# Patient Record
Sex: FEMALE | Race: WHITE | Employment: FULL TIME | URBAN - METROPOLITAN AREA
[De-identification: names, ages, dates, MRNs, and addresses within clinical notes are randomized per-mention and may not be internally consistent; named-entity substitution may affect disease eponyms.]

---

## 2018-01-30 ENCOUNTER — ED HISTORICAL/CONVERTED ENCOUNTER (OUTPATIENT)
Dept: OTHER | Age: 43
End: 2018-01-30

## 2018-02-03 ENCOUNTER — OFFICE VISIT (OUTPATIENT)
Dept: NEUROLOGY | Age: 43
End: 2018-02-03

## 2018-02-03 VITALS
OXYGEN SATURATION: 98 % | HEART RATE: 77 BPM | SYSTOLIC BLOOD PRESSURE: 114 MMHG | RESPIRATION RATE: 18 BRPM | TEMPERATURE: 98.2 F | HEIGHT: 63 IN | BODY MASS INDEX: 43.43 KG/M2 | WEIGHT: 245.1 LBS | DIASTOLIC BLOOD PRESSURE: 76 MMHG

## 2018-02-03 DIAGNOSIS — H81.12 BPV (BENIGN POSITIONAL VERTIGO), LEFT: Primary | ICD-10-CM

## 2018-02-03 RX ORDER — MECLIZINE HCL 25MG 25 MG/1
TABLET, CHEWABLE ORAL 3 TIMES DAILY
COMMUNITY

## 2018-02-03 NOTE — PROGRESS NOTES
[address] [facility] [address]   [place] [address]   Dolores Thompson    [facility]   [phone] Fax             Referring: ED    Chief Complaint   Patient presents with    Dizziness     continuous x 5 days   sudden onset            ER@ [facility]    Headache     episodic x 5 days      sudden onset    Vision Change     floaters, yellow lines (with vomiting)     18-year-old right-handed woman who presents today for evaluation of what she calls dizziness and headache. She is accompanied by her . She notes that this past Tuesday she awakened after going to bed Monday night completely fine, with dizziness. She says she got up on the side of the bed and fell into the floor. She notes that movement made it worse. She had nausea and vomiting. Notes that she just stayed in bed because she would have room spinning if she moved. When she tried to walk she would fall. She would vomit. Later that evening she went the emergency department at [facility] where she was given intravenous fluid and told she was dehydrated and she was told she had vertigo. She was given meclizine 3 times daily. She does not think it has helped much may be a little but it has made her quite drowsy. She says with movement she gets spinning of the room and she believes it spends left. Head movement will do the same. She has not had any diplopia. She has not had any perioral numbness. No weakness of the extremities. No numbness or tingling. Never had this before. She is not having any focal symptoms. She did have a CT scan that was negative. She notes that she is in bed and she rolls over she will get the symptoms and she is now sleeping on her back. Things are little better and she has gone back to work but notes that when she drives and turns her head to do a head check for changing lanes etc. she will get the symptoms. No chest pain or palpitations.   No fever chills. No rashes. Past Medical History:   Diagnosis Date    Headache     Stroke [facility]) 2005    Vertigo        Past Surgical History:   Procedure Laterality Date    HX ACL RECONSTRUCTION Right     HX BLADDER SUSPENSION      HX CARPAL TUNNEL RELEASE      x2    HX  SECTION      x3    HX HYSTERECTOMY  2014    HX UROLOGICAL         Current Outpatient Prescriptions   Medication Sig Dispense Refill    meclizine (ANTIVERT) 25 mg chewable tablet Take  by mouth three (3) times daily. Allergies   Allergen Reactions    Codeine Swelling       Social History   Substance Use Topics    Smoking status: Never Smoker    Smokeless tobacco: Never Used    Alcohol use 2.4 oz/week     4 Shots of liquor per week       Family History   Problem Relation Age of Onset    Cancer Father     Heart Disease Father     Stroke Father        Review of Systems  Pertinent positives and negatives are as noted above otherwise comprehensive systems review is negative    Examination  Visit Vitals    /76    Pulse 77    Temp 98.2 °F (36.8 °C) (Oral)    Resp 18    Ht 5' 3\" (1.6 m)    Wt 111.2 kg (245 lb 1.6 oz)    SpO2 98%    BMI 43.42 kg/m2     Pleasant, well appearing. No icterus. Oropharynx clear. Supple neck without bruit. Heart regular. No murmur. No edema. Neurologically, she is awake, alert, and oriented with normal speech and language. Her cognition is normal.  She is able to discuss her medical history. She is able to discuss her medications. She is able to discuss current events. Intact cranial nerves 2-12. No nystagmus. Visual fields full to confrontation. Disk margins are flat bilaterally. She has normal bulk and tone. She has no abnormal movement. She has no pronation or drift. She generates full strength in the upper and lower extremities to direct confrontational testing. Reflexes are symmetrical in the upper and lower extremities bilaterally.   Her toes are down bilaterally. No Avilez. Finger nose finger and rapid alternating movements are normal.  Steady gait. She is able to heel, toe, and tandem walk. No sensory deficit to primary modalities. No Romberg. Modified Baranay reproduced symptoms with left ear down. Impression/Plan  24-year-old pleasant lady with benign positional vertigo left-sided as symptoms are reproduced with left ear down. Reassuring is the fact that her symptoms are positional/movement induced and she has no symptom of vertigo and primary resting type position. We discussed benign positional vertigo. We will send her to vestibular therapy and she lives down in [place] [address] so we will send her to the [facility] where they have vestibular therapy available. Discussed what this entails. Discussed the concept of benign positional vertigo and I drew her some pictures to explain that. Discussed both repositioning maneuvers as well as conditioning exercises. As long as she does well from these all see her as needed. This note was created using voice recognition software. Despite editing, there may be syntax errors. This note will not be viewable in [address]

## 2018-02-03 NOTE — MR AVS SNAPSHOT
[address] [facility] [address] [facility] [id number] [phone] Patient: Cassius Tolentino MRN: [id number] Visit Information Date & Time Provider Department Dept. Phone Encounter #  
 2/3/2018 10:00 AM Rio Lassiter MD [facility] Neurology [facility] [phone] [id number] Follow-up Instructions Return if symptoms worsen or fail to improve. Upcoming Health Maintenance Date Due DTaP/Tdap/Td series (1 - Tdap) 1/12/1994 PAP AKA CERVICAL CYTOLOGY 1/12/1994 Influenza Age 5 to Adult 8/1/2017 Allergies as of 2/3/2018  Review Complete On: 2/3/2018 By: Rio Lassiter MD  
  
 Severity Noted Reaction Type Reactions Codeine  02/03/2018    Swelling Current Immunizations  Never Reviewed No immunizations on file. Not reviewed this visit You Were Diagnosed With   
  
 Codes Comments BPV (benign positional vertigo), left    -  Primary ICD-10-CM: H81.12 
ICD-9-CM: 386.11 Vitals BP Pulse Temp Resp Height(growth percentile) Weight(growth percentile) 114/76 77 98.2 °F (36.8 °C) (Oral) 18 5' 3\" (1.6 m) 245 lb 1.6 oz (111.2 kg) SpO2 BMI OB Status Smoking Status 98% 43.42 kg/m2 Hysterectomy Never Smoker Vitals History BMI and BSA Data Body Mass Index Body Surface Area  
 43.42 kg/m 2 2.22 m 2 Your Updated Medication List  
  
   
This list is accurate as of: 2/3/18 10:34 AM.  Always use your most recent med list.  
  
  
  
  
 meclizine 25 mg chewable tablet Commonly known as:  ANTIVERT Take  by mouth three (3) times daily. We Performed the Following REFERRAL TO PHYSICAL THERAPY [GYR81 Custom] Comments:  
 [facility] Follow-up Instructions Return if symptoms worsen or fail to improve. Referral Information Referral ID Referred By Referred To [id number] GEORGIE LANDRUM Not Available Visits Status Start Date End Date 1 New Request 2/3/18 2/3/19 If your referral has a status of pending review or denied, additional information will be sent to support the outcome of this decision. Patient Instructions Information Regarding Testing If you have physican order for a test or a medication denied by your insurance company, this does not mean the test or medication is not appropriate for you as that is a medical decision, not a decision to be made by an insurance company representative or by an [facility] physician who has not interviewed and examined you. This is a decision to be made between you and your physician. The denial of services is a contractual matter between you and your insurance company, not an issue between your physician and the insurance company. If your test or medication is denied, you can take the following steps to help resolve the issue: 1. File a complaint with the [facility] regarding your insurance company's denial of services ordered for you. You can do this either by calling them directly or by completing an on-line complaint form on the [email or web address]. This can be found at www.Straight Up English 2. Also file a formal complaint with your insurance company and ask to have the name of the person denying the service so that you may explore a legal option should you be harmed by this denial of service. Again, the fact the insurance company will not pay for the service does not mean it is not medically necessary and I would encourage you to follow through with the plan that was made with your physician 3. File a written complaint with your employer so your employer and benefit manager is aware of the poor coverage they are providing their employees. If you have medicare/medicaid, complain to your representative in the House and to your Beth Krueger. If we have ordered testing for you, we do not call patients with results and we do not give test results over the phone. We schedule follow up appointments so that your results can be discussed in person and any questions you have regarding them may be addressed. If something of concern is revealed on your test, we will call you for a sooner follow up appointment. Additionally, results may be found by using the My Chart feature and one of our patient service representatives at the  can give you instructions on how to access this feature of our electronic medical record system. Silvina[address] Gupta [address] What is a living will? A living will is a legal form you use to write down the kind of care you want at the end of your life. It is used by the health professionals who will treat you if you aren't able to decide for yourself. If you put your wishes in writing, your loved ones and others will know what kind of care you want. They won't need to guess. This can ease your mind and be helpful to others. A living will is not the same as an estate or property will. An estate will explains what you want to happen with your money and property after you die. Is a living will a legal document? A living will is a legal document. Each state has its own laws about living mcguire. If you move to another state, make sure that your living will is legal in the state where you now live. Or you might use a universal form that has been approved by many states. This kind of form can sometimes be completed and stored online. Your electronic copy will then be available wherever you have a connection to the Internet. In most cases, doctors will respect your wishes even if you have a form from a different state. · You don't need an  to complete a living will.  But legal advice can be helpful if your state's laws are unclear, your health history is complicated, or your family can't agree on what should be in your living will. · You can change your living will at any time. Some people find that their wishes about end-of-life care change as their health changes. · In addition to making a living will, think about completing a medical power of  form. This form lets you name the person you want to make end-of-life treatment decisions for you (your \"health care agent\") if you're not able to. Many hospitals and nursing homes will give you the forms you need to complete a living will and a medical power of . · Your living will is used only if you can't make or communicate decisions for yourself anymore. If you become able to make decisions again, you can accept or refuse any treatment, no matter what you wrote in your living will. · Your state may offer an online registry. This is a place where you can store your living will online so the doctors and nurses who need to treat you can find it right away. What should you think about when creating a living will? Talk about your end-of-life wishes with your family members and your doctor. Let them know what you want. That way the people making decisions for you won't be surprised by your choices. Think about these questions as you make your living will: · Do you know enough about life support methods that might be used? If not, talk to your doctor so you know what might be done if you can't breathe on your own, your heart stops, or you're unable to swallow. · What things would you still want to be able to do after you receive life-support methods? Would you want to be able to walk? To speak? To eat on your own? To live without the help of machines? · If you have a choice, where do you want to be cared for? In your home? At a hospital or nursing home? · Do you want certain Gnosticism practices performed if you become very ill? · If you have a choice at the end of your life, where would you prefer to die? At home? In a hospital or nursing home? Somewhere else? · Would you prefer to be buried or cremated? · Do you want your organs to be donated after you die? What should you do with your living will? · Make sure that your family members and your health care agent have copies of your living will. · Give your doctor a copy of your living will to keep in your medical record. If you have more than one doctor, make sure that each one has a copy. · You may want to put a copy of your living will where it can be easily found. Where can you learn more? Go to http://monique-mana.info/. Enter X459 in the search box to learn more about \"Learning About Living Per[place]. \" Current as of: September 24, 2016 Content Version: 11.4 © 1071-8722 DraftKings. Care instructions adapted under license by [employer] (which disclaims liability or warranty for this information). If you have questions about a medical condition or this instruction, always ask your healthcare professional. [address] any warranty or liability for your use of this information. Introducing [facility]! Laura Guo introduces Auxmoney patient portal. Now you can access parts of your medical record, email your doctor's office, and request medication refills online. 1. In your internet browser, go to [email or web address] 2. Click on the First Time User? Click Here link in the Sign In box. You will see the New Member Sign Up page. 3. Enter your Auxmoney Access Code exactly as it appears below. You will not need to use this code after youve completed the sign-up process. If you do not sign up before the expiration date, you must request a new code. · Auxmoney Access Code: [email or web address] Expires: 5/4/2018 10:34 AM 
 
 4. Enter the last four digits of your Social Security Number (xxxx) and Date of Birth (mm/dd/yyyy) as indicated and click Submit. You will be taken to the next sign-up page. 5. Create a Bannerman ID. This will be your Bannerman login ID and cannot be changed, so think of one that is secure and easy to remember. 6. Create a Bannerman password. You can change your password at any time. 7. Enter your Password Reset Question and Answer. This can be used at a later time if you forget your password. 8. Enter your e-mail address. You will receive e-mail notification when new information is available in [address] 9. Click Sign Up. You can now view and download portions of your medical record. 10. Click the Download Summary menu link to download a portable copy of your medical information. If you have questions, please visit the Frequently Asked Questions section of the Bannerman website. Remember, Bannerman is NOT to be used for urgent needs. For medical emergencies, dial 911. Now available from your iPhone and Android! Please provide this summary of care documentation to your next provider. If you have any questions after today's visit, please call [phone].

## 2018-02-03 NOTE — PATIENT INSTRUCTIONS
Information Regarding Testing     If you have physican order for a test or a medication denied by your insurance company, this does not mean the test or medication is not appropriate for you as that is a medical decision, not a decision to be made by an insurance company representative or by an [facility] physician who has not interviewed and examined you. This is a decision to be made between you and your physician. The denial of services is a contractual matter between you and your insurance company, not an issue between your physician and the insurance company. If your test or medication is denied, you can take the following steps to help resolve the issue:    1. File a complaint with the [facility] system regarding your insurance company's denial of services ordered for you. You can do this either by calling them directly or by completing an on-line complaint form on the [employer]. This can be found at [email or web address]    2. Also file a formal complaint with your insurance company and ask to have the name of the person denying the service so that you may explore a legal option should you be harmed by this denial of service. Again, the fact the insurance company will not pay for the service does not mean it is not medically necessary and I would encourage you to follow through with the plan that was made with your physician    3. File a written complaint with your employer so your employer and benefit manager is aware of the poor coverage they are providing their employees. If you have medicare/medicaid, complain to your representative in the House and to your Beth Krueger. If we have ordered testing for you, we do not call patients with results and we do not give test results over the phone. We schedule follow up appointments so that your results can be discussed in person and any questions you have regarding them may be addressed.   If something of concern is revealed on your test, we will call you for a sooner follow up appointment. Additionally, results may be found by using the My Chart feature and one of our patient service representatives at the  can give you instructions on how to access this feature of our electronic medical record system. Learning About Living Wilford Gilford  What is a living will? A living will is a legal form you use to write down the kind of care you want at the end of your life. It is used by the health professionals who will treat you if you aren't able to decide for yourself. If you put your wishes in writing, your loved ones and others will know what kind of care you want. They won't need to guess. This can ease your mind and be helpful to others. A living will is not the same as an estate or property will. An estate will explains what you want to happen with your money and property after you die. Is a living will a legal document? A living will is a legal document. Each state has its own laws about living mcguire. If you move to another state, make sure that your living will is legal in the state where you now live. Or you might use a universal form that has been approved by many states. This kind of form can sometimes be completed and stored online. Your electronic copy will then be available wherever you have a connection to the Internet. In most cases, doctors will respect your wishes even if you have a form from a different state. · You don't need an  to complete a living will. But legal advice can be helpful if your state's laws are unclear, your health history is complicated, or your family can't agree on what should be in your living will. · You can change your living will at any time. Some people find that their wishes about end-of-life care change as their health changes. · In addition to making a living will, think about completing a medical power of  form.  This form lets you name the person you want to make end-of-life treatment decisions for you (your \"health care agent\") if you're not able to. Many hospitals and nursing homes will give you the forms you need to complete a living will and a medical power of . · Your living will is used only if you can't make or communicate decisions for yourself anymore. If you become able to make decisions again, you can accept or refuse any treatment, no matter what you wrote in your living will. · Your state may offer an online registry. This is a place where you can store your living will online so the doctors and nurses who need to treat you can find it right away. What should you think about when creating a living will? Talk about your end-of-life wishes with your family members and your doctor. Let them know what you want. That way the people making decisions for you won't be surprised by your choices. Think about these questions as you make your living will:  · Do you know enough about life support methods that might be used? If not, talk to your doctor so you know what might be done if you can't breathe on your own, your heart stops, or you're unable to swallow. · What things would you still want to be able to do after you receive life-support methods? Would you want to be able to walk? To speak? To eat on your own? To live without the help of machines? · If you have a choice, where do you want to be cared for? In your home? At a hospital or nursing home? · Do you want certain Voodoo practices performed if you become very ill? · If you have a choice at the end of your life, where would you prefer to die? At home? In a hospital or nursing home? Somewhere else? · Would you prefer to be buried or cremated? · Do you want your organs to be donated after you die? What should you do with your living will? · Make sure that your family members and your health care agent have copies of your living will.   · Give your doctor a copy of your living will to keep in your medical record. If you have more than one doctor, make sure that each one has a copy. · You may want to put a copy of your living will where it can be easily found. Where can you learn more? Go to http://monique-mana.info/. Enter I545 in the search box to learn more about \"Learning About Living Perroy. \"  Current as of: September 24, 2016  Content Version: 11.4  © 9156-7213 Healthwise, allGreenup. Care instructions adapted under license by TrackR (which disclaims liability or warranty for this information). If you have questions about a medical condition or this instruction, always ask your healthcare professional. [address] any warranty or liability for your use of this information.

## 2018-02-06 ENCOUNTER — TELEPHONE (OUTPATIENT)
Dept: NEUROLOGY | Age: 43
End: 2018-02-06

## 2018-02-06 NOTE — TELEPHONE ENCOUNTER
----- Message from Ileana Jimenez sent at 2/6/2018  8:14 AM EST -----  Regarding: Dr. Dewayne Francois  Pt stated she has a referral for physical therapy and need a letter from the doctor stating she need physical therapy faxed to [facility]([phone]. Pt stated she has an appt at 10:30 a.m. this morning. Best contact number [phone].

## 2022-03-19 PROBLEM — H81.12 BPV (BENIGN POSITIONAL VERTIGO), LEFT: Status: ACTIVE | Noted: 2018-02-03

## 2023-10-18 ENCOUNTER — APPOINTMENT (OUTPATIENT)
Facility: HOSPITAL | Age: 48
End: 2023-10-18
Payer: COMMERCIAL

## 2023-10-18 ENCOUNTER — HOSPITAL ENCOUNTER (EMERGENCY)
Facility: HOSPITAL | Age: 48
Discharge: HOME | End: 2023-10-18
Attending: EMERGENCY MEDICINE
Payer: COMMERCIAL

## 2023-10-18 VITALS
RESPIRATION RATE: 20 BRPM | TEMPERATURE: 98.2 F | OXYGEN SATURATION: 96 % | SYSTOLIC BLOOD PRESSURE: 105 MMHG | BODY MASS INDEX: 45.64 KG/M2 | WEIGHT: 248 LBS | DIASTOLIC BLOOD PRESSURE: 72 MMHG | HEIGHT: 62 IN | HEART RATE: 81 BPM

## 2023-10-18 DIAGNOSIS — R06.02 SHORTNESS OF BREATH: Primary | ICD-10-CM

## 2023-10-18 LAB
ANION GAP SERPL CALC-SCNC: 5 MMOL/L (ref 5–15)
BASOPHILS # BLD: 0.1 K/UL (ref 0–0.1)
BASOPHILS NFR BLD: 1 % (ref 0–1)
BUN SERPL-MCNC: 15 MG/DL (ref 6–20)
BUN/CREAT SERPL: 18 (ref 12–20)
CA-I BLD-MCNC: 9.1 MG/DL (ref 8.5–10.1)
CHLORIDE SERPL-SCNC: 105 MMOL/L (ref 97–108)
CO2 SERPL-SCNC: 28 MMOL/L (ref 21–32)
CREAT SERPL-MCNC: 0.85 MG/DL (ref 0.55–1.02)
DIFFERENTIAL METHOD BLD: NORMAL
EOSINOPHIL # BLD: 0.3 K/UL (ref 0–0.4)
EOSINOPHIL NFR BLD: 3 % (ref 0–7)
ERYTHROCYTE [DISTWIDTH] IN BLOOD BY AUTOMATED COUNT: 12.7 % (ref 11.5–14.5)
GLUCOSE SERPL-MCNC: 114 MG/DL (ref 65–100)
HCT VFR BLD AUTO: 44.3 % (ref 35–47)
HGB BLD-MCNC: 14.6 G/DL (ref 11.5–16)
IMM GRANULOCYTES # BLD AUTO: 0 K/UL (ref 0–0.04)
IMM GRANULOCYTES NFR BLD AUTO: 0 % (ref 0–0.5)
LYMPHOCYTES # BLD: 2.9 K/UL (ref 0.8–3.5)
LYMPHOCYTES NFR BLD: 29 % (ref 12–49)
MAGNESIUM SERPL-MCNC: 2.4 MG/DL (ref 1.6–2.4)
MCH RBC QN AUTO: 29.8 PG (ref 26–34)
MCHC RBC AUTO-ENTMCNC: 33 G/DL (ref 30–36.5)
MCV RBC AUTO: 90.4 FL (ref 80–99)
MONOCYTES # BLD: 0.7 K/UL (ref 0–1)
MONOCYTES NFR BLD: 7 % (ref 5–13)
NEUTS SEG # BLD: 6.1 K/UL (ref 1.8–8)
NEUTS SEG NFR BLD: 60 % (ref 32–75)
NRBC # BLD: 0 K/UL (ref 0–0.01)
NRBC BLD-RTO: 0 PER 100 WBC
PLATELET # BLD AUTO: 283 K/UL (ref 150–400)
PMV BLD AUTO: 10.6 FL (ref 8.9–12.9)
POTASSIUM SERPL-SCNC: 3.4 MMOL/L (ref 3.5–5.1)
RBC # BLD AUTO: 4.9 M/UL (ref 3.8–5.2)
SODIUM SERPL-SCNC: 138 MMOL/L (ref 136–145)
TROPONIN I SERPL HS-MCNC: 4 NG/L (ref 0–51)
WBC # BLD AUTO: 10 K/UL (ref 3.6–11)

## 2023-10-18 PROCEDURE — 96360 HYDRATION IV INFUSION INIT: CPT

## 2023-10-18 PROCEDURE — 84484 ASSAY OF TROPONIN QUANT: CPT

## 2023-10-18 PROCEDURE — 93005 ELECTROCARDIOGRAM TRACING: CPT | Performed by: EMERGENCY MEDICINE

## 2023-10-18 PROCEDURE — 2580000003 HC RX 258: Performed by: EMERGENCY MEDICINE

## 2023-10-18 PROCEDURE — 85025 COMPLETE CBC W/AUTO DIFF WBC: CPT

## 2023-10-18 PROCEDURE — 83735 ASSAY OF MAGNESIUM: CPT

## 2023-10-18 PROCEDURE — 80048 BASIC METABOLIC PNL TOTAL CA: CPT

## 2023-10-18 PROCEDURE — 99285 EMERGENCY DEPT VISIT HI MDM: CPT

## 2023-10-18 PROCEDURE — 36415 COLL VENOUS BLD VENIPUNCTURE: CPT

## 2023-10-18 PROCEDURE — 71045 X-RAY EXAM CHEST 1 VIEW: CPT

## 2023-10-18 RX ORDER — 0.9 % SODIUM CHLORIDE 0.9 %
1000 INTRAVENOUS SOLUTION INTRAVENOUS
Status: COMPLETED | OUTPATIENT
Start: 2023-10-18 | End: 2023-10-18

## 2023-10-18 RX ADMIN — SODIUM CHLORIDE 1000 ML: 9 INJECTION, SOLUTION INTRAVENOUS at 03:31

## 2023-10-18 NOTE — DISCHARGE INSTRUCTIONS
Ref Range    Troponin, High Sensitivity 4 0 - 51 ng/L   Magnesium    Collection Time: 10/18/23  2:23 AM   Result Value Ref Range    Magnesium 2.4 1.6 - 2.4 mg/dL       Radiologic Studies  XR CHEST PORTABLE   Final Result      No acute process. ------------------------------------------------------------------------------------------------------------  The exam and treatment you received in the Emergency Department were for an urgent problem and are not intended as complete care. It is important that you follow-up with a doctor, nurse practitioner, or physician assistant to:  (1) confirm your diagnosis,  (2) re-evaluation of changes in your illness and treatment, and  (3) for ongoing care. Please take your discharge instructions with you when you go to your follow-up appointment. If you have any problem arranging a follow-up appointment, contact the Emergency Department. If your symptoms become worse or you do not improve as expected and you are unable to reach your health care provider, please return to the Emergency Department. We are available 24 hours a day. If a prescription has been provided, please have it filled as soon as possible to prevent a delay in treatment. If you have any questions or reservations about taking the medication due to side effects or interactions with other medications, please call your primary care provider or contact the ER.

## 2023-10-18 NOTE — ED PROVIDER NOTES
[facility] EMERGENCY DEPT  EMERGENCY DEPARTMENT HISTORY AND PHYSICAL EXAM      Date: 10/18/2023  Patient Name: Mylene Gusman  MRN: [id number]  [address] [facility] [place] 1975  Date of evaluation: 10/18/2023  Provider: Rabia Watson MD   Note Started: 2:16 AM EDT 10/18/23    HISTORY OF PRESENT ILLNESS     Chief Complaint   Patient presents with    Panic Attack       History Provided By: Patient    HPI: Mylene Gusman is a 46 y.o. female presents to the emergency department for shortness of breath and feeling she is going to pass out. Patient was in the room with her daughter who was getting an IV for her evaluation when she started feeling her symptoms. Staff assisted her by lifting her to the stretcher without any loss of consciousness. PAST MEDICAL HISTORY   Past Medical History:  History reviewed. No pertinent past medical history. Past Surgical History:  Past Surgical History:   Procedure Laterality Date    CHOLECYSTECTOMY      HYSTERECTOMY (CERVIX STATUS UNKNOWN)         Family History:  History reviewed. No pertinent family history. Social History:  Social History     Tobacco Use    Smoking status: Never    Smokeless tobacco: Never   Substance Use Topics    Alcohol use: Not Currently    Drug use: Not Currently       Allergies:  No Known Allergies    PCP: Elsie Gary MD    Current Meds:   Current Facility-Administered Medications   Medication Dose Route Frequency Provider Last Rate Last Admin    sodium chloride 0.9 % bolus 1,000 mL  1,000 mL IntraVENous NOW Rabia Watson .6 mL/hr at 10/18/23 0331 1,000 mL at 10/18/23 0331     No current outpatient medications on file.        Social Determinants of Health:   Social Determinants of Health     Tobacco Use: Low Risk  (10/18/2023)    Patient History     Smoking Tobacco Use: Never     Smokeless Tobacco Use: Never     Passive Exposure: Not on file   Alcohol Use: Not on file   Financial Resource Strain: Not on file   Food Insecurity: Not on

## 2023-10-18 NOTE — ED TRIAGE NOTES
Patient's daughter is being seen in the ED. While trying to get blood work on patient's daughter, patient became upset, asking to give her daughter a break from getting blood work; then states \"I feel like I am going to pass out\". Patient went and sat on the chair. When about to leave out the room, patient stating, \"I don't feel good\" and starts breathing heavy. Patient stating she is SOB. Asked for assistance from other staff to get patient a stretcher. Multiple staff lifted patient up onto stretcher with no assistance from patient. Patient \"gasping\" for air.   Placed SpO2 on patient's finger; SpO2 reading 97% with a heart rate of 89 bpm.

## 2023-10-19 LAB
EKG ATRIAL RATE: 69 BPM
EKG DIAGNOSIS: NORMAL
EKG P AXIS: 50 DEGREES
EKG P-R INTERVAL: 148 MS
EKG Q-T INTERVAL: 414 MS
EKG QRS DURATION: 94 MS
EKG QTC CALCULATION (BAZETT): 443 MS
EKG R AXIS: 68 DEGREES
EKG T AXIS: 44 DEGREES
EKG VENTRICULAR RATE: 69 BPM

## 2024-08-26 ENCOUNTER — HOSPITAL ENCOUNTER (OUTPATIENT)
Facility: HOSPITAL | Age: 49
Discharge: HOME | End: 2024-08-29
Payer: COMMERCIAL

## 2024-08-26 ENCOUNTER — OFFICE VISIT (OUTPATIENT)
Age: 49
End: 2024-08-26

## 2024-08-26 VITALS
HEART RATE: 79 BPM | SYSTOLIC BLOOD PRESSURE: 103 MMHG | RESPIRATION RATE: 15 BRPM | BODY MASS INDEX: 42.7 KG/M2 | WEIGHT: 241 LBS | HEIGHT: 63 IN | OXYGEN SATURATION: 93 % | TEMPERATURE: 98.4 F | DIASTOLIC BLOOD PRESSURE: 79 MMHG

## 2024-08-26 DIAGNOSIS — Z20.89 PNEUMONIA EXPOSURE: ICD-10-CM

## 2024-08-26 DIAGNOSIS — R05.1 ACUTE COUGH: Primary | ICD-10-CM

## 2024-08-26 PROCEDURE — 71046 X-RAY EXAM CHEST 2 VIEWS: CPT

## 2024-08-26 RX ORDER — DOXYCYCLINE HYCLATE 100 MG
100 TABLET ORAL 2 TIMES DAILY
Qty: 14 TABLET | Refills: 0 | Status: SHIPPED | OUTPATIENT
Start: 2024-08-26 | End: 2024-08-26

## 2024-08-26 RX ORDER — AZITHROMYCIN 250 MG/1
TABLET, FILM COATED ORAL
Qty: 6 TABLET | Refills: 0 | Status: SHIPPED | OUTPATIENT
Start: 2024-08-26 | End: 2024-09-05

## 2024-08-26 RX ORDER — GUAIFENESIN 200 MG/10ML
200 LIQUID ORAL 3 TIMES DAILY PRN
Qty: 210 ML | Refills: 0 | Status: SHIPPED | OUTPATIENT
Start: 2024-08-26 | End: 2024-09-02

## 2024-08-26 NOTE — PROGRESS NOTES
Eliana Chi (:  1975) is a 49 y.o. female,New patient, here for evaluation of the following chief complaint(s):  Cough and Shortness of Breath (Pt c/o painful cough, SOB for 5 days. States dayquill and robitussin aren't working. Pt's daughter has bronchitis and pneumonia. )       ASSESSMENT/PLAN:  1. Acute cough  -     POCT COVID-19, Antigen  -     azithromycin (ZITHROMAX) 250 MG tablet; 500mg on day 1 followed by 250mg on days 2 - 5, Disp-6 tablet, R-0Normal  2. Pneumonia exposure  -     XR CHEST PA/LAT; Future  -     azithromycin (ZITHROMAX) 250 MG tablet; 500mg on day 1 followed by 250mg on days 2 - 5, Disp-6 tablet, R-0Normal      Please follow up with chest x ray at [facility] : [address], [place] [address]   I am treating you today for presumptive community acquired PNA.  I will call you with any necessary antibiotic changes and x ray results.  Please follow up with your PCP in 3-5 days.  Call or return to clinic if no improvement or any worsening  Patient comfortable with plan     CXR:  INDICATION: Contact with and (suspected) exposure to other communicable  diseases. Shortness of breath. Cough.     COMPARISON: 10/18/2023     TECHNIQUE: PA and lateral views of the chest.     FINDINGS:     Lungs are clear.  The cardiomediastinal configuration is within normal limits.  No acute bony abnormalities.     IMPRESSION:  No acute cardiopulmonary abnormalities.      SUBJECTIVE/OBJECTIVE:    History provided by:  Patient   used: No          49 y.o. female presents with symptoms of cough for the past few days and has had recently an exposure to PNA. She has stridor in both lung fields posteriorly. Normal S1, S2 with no murmurs, gallops or rubs. She is coughing and has chest tenderness which she describes as \"knife pain.\"         Physical Exam  Constitutional:       General: She is not in acute distress.     Appearance: Normal appearance. She is not ill-appearing,

## 2024-08-27 ENCOUNTER — TELEPHONE (OUTPATIENT)
Age: 49
End: 2024-08-27

## 2024-08-27 NOTE — TELEPHONE ENCOUNTER
Patient called to state that the pharmacy have not received her prescriptions from yesterday. We have the correct pharmacy information in her chart. Please resend them. Thank you.

## 2025-08-07 ENCOUNTER — TRANSCRIBE ORDERS (OUTPATIENT)
Facility: HOSPITAL | Age: 50
End: 2025-08-07

## 2025-08-07 DIAGNOSIS — M23.51 CHRONIC INSTABILITY OF RIGHT KNEE: Primary | ICD-10-CM

## 2025-08-15 ENCOUNTER — HOSPITAL ENCOUNTER (OUTPATIENT)
Facility: HOSPITAL | Age: 50
Discharge: HOME | End: 2025-08-18
Payer: COMMERCIAL

## 2025-08-15 DIAGNOSIS — M23.51 CHRONIC INSTABILITY OF RIGHT KNEE: ICD-10-CM

## 2025-08-15 PROCEDURE — 73721 MRI JNT OF LWR EXTRE W/O DYE: CPT
